# Patient Record
Sex: FEMALE
[De-identification: names, ages, dates, MRNs, and addresses within clinical notes are randomized per-mention and may not be internally consistent; named-entity substitution may affect disease eponyms.]

---

## 2022-11-16 ENCOUNTER — NURSE TRIAGE (OUTPATIENT)
Dept: OTHER | Facility: CLINIC | Age: 62
End: 2022-11-16

## 2022-11-17 NOTE — TELEPHONE ENCOUNTER
Location of patient: [place]    Subjective: Caller states \"been retaining water really bad, last time this happened she had blisters on her leg. Started to get little red bruising under the skin, the skin is stretching out. Has a couple of small wounds that are spreading. \"     Current Symptoms: leg swelling that begins in her feet and ankles that goes past knees. Gary Constable down and hurt herself a few days with pressure and throbbing in left hip and tailbone. Difficulty breathing, is not breathing normal, feels like pressure and is unable to take a whole breath since the last week. Had a blood clot from hernia surgery a couple weeks ago. Associated Symptoms: NA    Pain Severity: 8/10; pressure; constant    Temperature: denies by unknown method    What has been tried: takes Lasix, feet elevation    Recommended disposition: Emergency Department     Care advice provided, patient verbalizes understanding; denies any other questions or concerns. Outcome: Patient/caller agrees to proceed to [facility] Emergency Department   Reason for Disposition   Difficulty breathing    Protocols used:  Ankle Swelling-ADULT-